# Patient Record
Sex: FEMALE | Race: WHITE
[De-identification: names, ages, dates, MRNs, and addresses within clinical notes are randomized per-mention and may not be internally consistent; named-entity substitution may affect disease eponyms.]

---

## 2019-08-18 ENCOUNTER — HOSPITAL ENCOUNTER (EMERGENCY)
Dept: HOSPITAL 97 - ER | Age: 44
Discharge: HOME | End: 2019-08-18
Payer: SELF-PAY

## 2019-08-18 DIAGNOSIS — F17.210: ICD-10-CM
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
## 2019-08-18 DIAGNOSIS — Z88.0: ICD-10-CM
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
## 2019-08-18 DIAGNOSIS — R42: Primary | ICD-10-CM

## 2019-08-18 PROCEDURE — 99284 EMERGENCY DEPT VISIT MOD MDM: CPT

## 2019-08-18 NOTE — EDPHYS
Physician Documentation                                                                           

 [facility]                                                                 

Name: Katarzyna Roth                                                                            

Age: 44 yrs                                                                                       

Sex: Female                                                                                       

: 1975                                                                                   

MRN: [id number]                                                                                   

Arrival Date: 2019                                                                          

Time: 08:43                                                                                       

Account#: [id number]                                                                            

Bed 5                                                                                             

Private MD:                                                                                       

ED Physician Garth Tyler                                                                       

HPI:                                                                                              

                                                                                             

08:57 This 44 yrs old  Female presents to ER via Ambulatory with complaints of       gs  

      Dizziness.                                                                                  

08:57 The patient presents with dizziness. Onset: The symptoms/episode began/occurred 3       gs  

      year(s) ago. Context: head injury, about 3-4 weeks ago hit head on cabinet no loc           

      started getting dizzy again. Modifying factors: The symptoms are alleviated by nothing,     

      the symptoms are aggravated by nothing. Associated signs and symptoms: Pertinent            

      negatives: confusion. Severity of symptoms: At their worst the symptoms were moderate       

      in the emergency department the symptoms have improved markedly. The patient has            

      experienced similar episodes in the past, multiple times.                                   

                                                                                                  

OB/GYN:                                                                                           

08:54 LMP 2019                                                                              ss  

                                                                                                  

Historical:                                                                                       

- Allergies:                                                                                      

08:54 PENICILLINS;                                                                            ss  

- Home Meds:                                                                                      

08:54 None [Active];                                                                          ss  

- PMHx:                                                                                           

08:54 None;                                                                                   ss  

- PSHx:                                                                                           

08:54 None;                                                                                   ss  

                                                                                                  

- Immunization history:: Adult Immunizations up to date.                                          

- Social history:: Smoking status: Patient uses tobacco products, smokes one-half pack            

  cigarettes per day.                                                                             

- Ebola Screening: : No symptoms or risks identified at this time.                                

                                                                                                  

                                                                                                  

ROS:                                                                                              

08:57 All other systems are negative.                                                         gs  

08:57 Psych: Positive for anger episodes.                                                     gs  

                                                                                                  

Exam:                                                                                             

08:57 Head/Face:  Normocephalic, atraumatic. Eyes:  Pupils equal round and reactive to light, gs  

      extra-ocular motions intact.  Lids and lashes normal.  Conjunctiva and sclera are           

      non-icteric and not injected.  Cornea within normal limits.  Periorbital areas with no      

      swelling, redness, or edema. ENT:  Nares patent. No nasal discharge, no septal              

      abnormalities noted.  Tympanic membranes are normal and external auditory canals are        

      clear.  Oropharynx with no redness, swelling, or masses, exudates, or evidence of           

      obstruction, uvula midline.  Mucous membranes moist. Neck:  Trachea midline, no             

      thyromegaly or masses palpated, and no cervical lymphadenopathy.  Supple, full range of     

      motion without nuchal rigidity, or vertebral point tenderness.  No Meningismus.             

      Chest/axilla:  Normal chest wall appearance and motion.  Nontender with no deformity.       

      No lesions are appreciated. Cardiovascular:  Regular rate and rhythm with a normal S1       

      and S2.  No gallops, murmurs, or rubs.  Normal PMI, no JVD.  No pulse deficits.             

      Respiratory:  Lungs have equal breath sounds bilaterally, clear to auscultation and         

      percussion.  No rales, rhonchi or wheezes noted.  No increased work of breathing, no        

      retractions or nasal flaring. Abdomen/GI:  Soft, non-tender, with normal bowel sounds.      

      No distension or tympany.  No guarding or rebound.  No evidence of tenderness               

      throughout. Back:  No spinal tenderness.  No costovertebral tenderness.  Full range of      

      motion. Skin:  Warm, dry with normal turgor.  Normal color with no rashes, no lesions,      

      and no evidence of cellulitis. MS/ Extremity:  Pulses equal, no cyanosis.                   

      Neurovascular intact.  Full, normal range of motion.                                        

08:57 Constitutional: The patient appears alert, awake.                                           

08:57 Neuro: Orientation: is normal, Cranial nerves: grossly normal, Cerebellar function: is      

      grossly normal, normal finger to nose testing, Motor: strength is normal, Sensation:        

      pin prick testing is normal.                                                                

08:57 Psych: Behavior/mood is pleasant.                                                           

                                                                                                  

Vital Signs:                                                                                      

08:54  / 79; Pulse 75; Resp 19; Temp 97.4(TE); Pulse Ox 100% on R/A;                    ss  

                                                                                                  

MDM:                                                                                              

08:55 Patient medically screened.                                                               

08:57 Differential diagnosis: idiopathic dizziness, vertigo. Data reviewed: vital signs,        

      nurses notes. Counseling: I had a detailed discussion with the patient and/or guardian      

      regarding: the historical points, exam findings, and any diagnostic results supporting      

      the discharge/admit diagnosis.                                                              

08:57 ED course: chronic issue doesn't want ct just referral to neurology.                    gs  

                                                                                                  

Administered Medications:                                                                         

No medications were administered                                                                  

                                                                                                  

                                                                                                  

Disposition:                                                                                      

19 09:00 Discharged to Home. Impression: Dizziness and giddiness.                           

- Condition is Stable.                                                                            

- Discharge Instructions: Dizziness.                                                              

                                                                                                  

- Medication Reconciliation Form, Thank You Letter, Antibiotic Education, Prescription            

  Opioid Use form.                                                                                

- Follow up: Maurice Virgen MD; When: 2 - 3 days; Reason: Re-evaluation by your                

  physician.                                                                                      

                                                                                                  

                                                                                                  

                                                                                                  

Signatures:                                                                                       

Karin Elena RN                    RN   hung Germainch, Jacqueline, RN                      RN                                                      

Katarzyna Hernandez RN                     RN                                                      

Garth Tyler MD MD gs                                                   

                                                                                                  

Corrections: (The following items were deleted from the chart)                                    

09:13 09:00 2019 09:00 Discharged to Home. Impression: Dizziness and giddiness.         hb  

      Condition is Stable. Forms are Medication Reconciliation Form, Thank You Letter,            

      Antibiotic Education, Prescription Opioid Use. Follow up: Maurice Virgen; When: 2 - 3      

      days; Reason: Re-evaluation by your physician. gs                                           

                                                                                                  

**************************************************************************************************

## 2019-08-18 NOTE — ER
Nurse's Notes                                                                                     

 [facility]                                                                 

Name: Katarzyna Roth                                                                            

Age: 44 yrs                                                                                       

Sex: Female                                                                                       

: 1975                                                                                   

MRN: [id number]                                                                                   

Arrival Date: 2019                                                                          

Time: 08:43                                                                                       

Account#: [id number]                                                                            

Bed 5                                                                                             

Private MD:                                                                                       

Diagnosis: Dizziness and giddiness                                                                

                                                                                                  

Presentation:                                                                                     

                                                                                             

08:50 Presenting complaint: Patient states: intermittent dizziness, double vision and bouts   ss  

      of anger that began 2 weeks ago after getting hit in the head with a heavy object at        

      work. Transition of care: patient was not received from another setting of care. Onset      

      of symptoms is unknown. Risk Assessment: Do you want to hurt yourself or someone else?      

      Patient reports no desire to harm self or others. Initial Sepsis Screen: Does the           

      patient meet any 2 criteria? No. Patient's initial sepsis screen is negative. Does the      

      patient have a suspected source of infection? No. Patient's initial sepsis screen is        

      negative. Care prior to arrival: None.                                                      

08:50 Method Of Arrival: Ambulatory                                                           ss  

08:50 Acuity: DORITA 4                                                                           ss  

                                                                                                  

OB/GYN:                                                                                           

08:54 LMP 2019                                                                              ss  

                                                                                                  

Historical:                                                                                       

- Allergies:                                                                                      

08:54 PENICILLINS;                                                                            ss  

- Home Meds:                                                                                      

08:54 None [Active];                                                                          ss  

- PMHx:                                                                                           

08:54 None;                                                                                   ss  

- PSHx:                                                                                           

08:54 None;                                                                                   ss  

                                                                                                  

- Immunization history:: Adult Immunizations up to date.                                          

- Social history:: Smoking status: Patient uses tobacco products, smokes one-half pack            

  cigarettes per day.                                                                             

- Ebola Screening: : No symptoms or risks identified at this time.                                

                                                                                                  

                                                                                                  

Screenin:51 Abuse screen: Denies threats or abuse. Denies injuries from another. Nutritional        sv  

      screening: No deficits noted. Tuberculosis screening: No symptoms or risk factors           

      identified. Fall Risk None identified.                                                      

                                                                                                  

Assessment:                                                                                       

08:50 General: Appears in no apparent distress. comfortable, well groomed, well developed,    sv  

      Behavior is calm, cooperative, appropriate for age. Pain: Denies pain. Neuro: Level of      

      Consciousness is awake, alert, obeys commands, Oriented to person, place, time,             

      situation, Moves all extremities. Full function Gait is steady, Speech is normal,           

      Facial symmetry appears normal, Reports dizziness, intermittent episodes of anger.          

      Cardiovascular: Patient's skin is warm and dry. Rhythm is sinus rhythm. Respiratory:        

      Airway is patent Respiratory effort is even, unlabored, Respiratory pattern is regular,     

      symmetrical. Derm: Skin is pink, warm \T\ dry. Musculoskeletal: Range of motion: intact     

      in all extremities.                                                                         

09:12 Reassessment: Patient appears in no apparent distress at this time. No changes from       

      previously documented assessment. Patient and/or family updated on plan of care and         

      expected duration. Pain level reassessed. Patient is alert, oriented x 3, equal             

      unlabored respirations, skin warm/dry/pink.                                                 

                                                                                                  

Vital Signs:                                                                                      

08:54  / 79; Pulse 75; Resp 19; Temp 97.4(TE); Pulse Ox 100% on R/A;                    ss  

                                                                                                  

ED Course:                                                                                        

08:43 Patient arrived in ED.                                                                  rg4 

08:46 Garth Tyler MD is Attending Physician.                                              gs  

08:51 Karin Elena RN is Primary Nurse.                                                  sv  

08:51 Arm band placed on.                                                                     sv  

08:51 Patient has correct armband on for positive identification. Placed in gown. Bed in low  sv  

      position. Call light in reach. Cardiac monitor on. Pulse ox on. NIBP on. Door closed.       

      Head of bed elevated.                                                                       

08:53 Triage completed.                                                                       ss  

08:56 ED physician to see patient.                                                            sv  

09:00 Maurice Virgen MD is Referral Physician.                                             gs  

09:13 No provider procedures requiring assistance completed. Patient did not have IV access   hb  

      during this emergency room visit.                                                           

                                                                                                  

Administered Medications:                                                                         

No medications were administered                                                                  

                                                                                                  

                                                                                                  

Outcome:                                                                                          

09:00 Discharge ordered by MD.                                                                gs  

09:12 Discharged to home ambulatory.                                                          hb  

09:12 Condition: stable                                                                           

09:12 Discharge instructions given to patient, Instructed on discharge instructions, follow       

      up and referral plans. Demonstrated understanding of instructions, follow-up care.          

09:13 Patient left the ED.                                                                    hb  

                                                                                                  

Signatures:                                                                                       

Karin Elena, SCAR ABBOTT                                                      

Jacqueline Carreno RN RN                                                      

Katarzyna Hernandez RN RN hb Garcia, Rubi                                 rg4                                                  

Garth Tyler MD MD                                                      

                                                                                                  

**************************************************************************************************